# Patient Record
Sex: FEMALE | Race: WHITE | NOT HISPANIC OR LATINO | URBAN - METROPOLITAN AREA
[De-identification: names, ages, dates, MRNs, and addresses within clinical notes are randomized per-mention and may not be internally consistent; named-entity substitution may affect disease eponyms.]

---

## 2024-10-22 ENCOUNTER — APPOINTMENT (RX ONLY)
Dept: URBAN - METROPOLITAN AREA CLINIC 94 | Facility: CLINIC | Age: 54
Setting detail: DERMATOLOGY
End: 2024-10-22

## 2024-10-22 DIAGNOSIS — I87.2 VENOUS INSUFFICIENCY (CHRONIC) (PERIPHERAL): ICD-10-CM | Status: INADEQUATELY CONTROLLED

## 2024-10-22 PROCEDURE — ? MEDICAL CONSULTATION: VENOUS DISEASE

## 2024-10-22 PROCEDURE — ? OTHER

## 2024-10-22 PROCEDURE — ? COUNSELING

## 2024-10-22 PROCEDURE — 99213 OFFICE O/P EST LOW 20 MIN: CPT

## 2024-10-22 PROCEDURE — ? COMPRESSION STOCKING FITTING

## 2024-10-22 PROCEDURE — ? PHOTO-DOCUMENTATION

## 2024-10-22 ASSESSMENT — LOCATION ZONE DERM: LOCATION ZONE: LEG

## 2024-10-22 ASSESSMENT — LOCATION DETAILED DESCRIPTION DERM
LOCATION DETAILED: RIGHT DISTAL PRETIBIAL REGION
LOCATION DETAILED: LEFT ANTERIOR DISTAL THIGH

## 2024-10-22 ASSESSMENT — LOCATION SIMPLE DESCRIPTION DERM
LOCATION SIMPLE: RIGHT PRETIBIAL REGION
LOCATION SIMPLE: LEFT THIGH

## 2024-10-22 NOTE — PROCEDURE: COMPRESSION STOCKING FITTING
Toe Type: Open
Strength: 20-30 mmHg
Additional Instructions: She is given a hand written prescription for custom fit medical grade thigh high compression stockings be obtained from a surgical supply store as discussed.
Detail Level: Simple
Pantihose Type: Part A
Stocking Type: Stockings

## 2024-10-22 NOTE — HPI: VEIN EVALUATION
fh_vein_disease_yes
Which Leg Is Worse?: Right
Do You Have A Family History Of Bleeding Disorders?: no
fh_clotting_disorders_yes
How Severe Is/Are Your Symptoms?: mild
Is This A New Presentation, Or A Follow-Up?: Vein Evaluation
Additional History: Two pregnancies. She denies any pain, cramps, heaviness, or swelling.
Referred By:: In office advertising
Family History Of Bleeding Disorders (Include Family Member And Type Of Clotting Disorder):: Father
Patient's Profession?: Barbara [email or web address]

## 2024-10-22 NOTE — PROCEDURE: MEDICAL CONSULTATION: VENOUS DISEASE
Right Leg Varicose Veins: 1- Few, scattered: branch varicose veins
Left Leg Ulcer Diameter: 0- None
Detail Level: Detailed
Left Leg Circumference: medium
Left Leg Pain: 2- Daily, moderate activity limitation, occasional analgesics
Clinical Classification Set 2: C0 - no visible or palpable signs of venous disease
Left Leg Compression Therapy: 0- None or noncompliant
Etiology Set 2: Ec - Congenital
Right Leg Venous Edema: 1- Evening ankle edema only
Include Ceap In The Note?: No
Right Dorsalis Pedis Pulse: 2 (Easily palpable)
Anatomy Set 2: As - Superficial Veins
Right Leg Venous Hyperpigmentation: 0- None or focal low intensity (tan)
Pathophysiology Set 2: Pr - Reflux
Follow Up Instructions:: Preventive strategies include weight loss through diet and exercise and toning leg muscles. A venous fact sheet was given, which reviews venous anatomy/pathophysiology and treatment options. The pathophysiology of venous disease and potential treatment options were discussed in detail, especially the non-FDA status of foam sclerotherapy with its risks benefits and alternatives. The patient's questions were answered in full.

## 2024-10-22 NOTE — PROCEDURE: OTHER
Other (Free Text): She is also interested in cosmetic treatments in the future as well.
Note Text (......Xxx Chief Complaint.): This diagnosis correlates with the
Detail Level: Zone
Render Risk Assessment In Note?: no